# Patient Record
Sex: FEMALE | Race: WHITE | Employment: FULL TIME | URBAN - METROPOLITAN AREA
[De-identification: names, ages, dates, MRNs, and addresses within clinical notes are randomized per-mention and may not be internally consistent; named-entity substitution may affect disease eponyms.]

---

## 2018-06-21 PROBLEM — R79.89 LOW VITAMIN D LEVEL: Status: ACTIVE | Noted: 2018-06-21

## 2020-10-23 PROBLEM — M54.6 PAIN IN THORACIC SPINE: Status: ACTIVE | Noted: 2018-06-07

## 2020-10-23 PROBLEM — R06.02 SHORTNESS OF BREATH: Status: ACTIVE | Noted: 2019-03-03

## 2020-10-23 PROBLEM — F17.210 NICOTINE DEPENDENCE, CIGARETTES, UNCOMPLICATED: Status: ACTIVE | Noted: 2019-03-03

## 2020-10-23 PROBLEM — R07.89 OTHER CHEST PAIN: Status: ACTIVE | Noted: 2019-03-03

## 2020-10-26 DIAGNOSIS — R25.1 TREMOR: ICD-10-CM

## 2020-10-26 LAB
FOLATE: 18.1 NG/ML (ref 7.3–26.1)
T3 TOTAL: 1.19 NG/ML (ref 0.8–2)
T4 TOTAL: 7.1 UG/DL (ref 4.5–11.7)
TSH REFLEX: 1.23 UIU/ML (ref 0.44–3.86)
VITAMIN B-12: 802 PG/ML (ref 232–1245)

## 2021-06-24 PROBLEM — F51.01 PRIMARY INSOMNIA: Status: ACTIVE | Noted: 2021-06-24

## 2022-02-02 PROBLEM — R52 PAIN, UNSPECIFIED: Status: ACTIVE | Noted: 2020-11-20

## 2022-02-02 PROBLEM — M54.9 DORSALGIA, UNSPECIFIED: Status: ACTIVE | Noted: 2019-03-03

## 2022-04-07 PROBLEM — M25.519 SHOULDER PAIN: Status: ACTIVE | Noted: 2022-04-07

## 2022-04-07 PROBLEM — M75.00 ADHESIVE CAPSULITIS OF SHOULDER: Status: ACTIVE | Noted: 2022-04-07

## 2022-11-04 PROBLEM — R52 PAIN, UNSPECIFIED: Status: RESOLVED | Noted: 2020-11-20 | Resolved: 2022-11-04

## 2022-11-04 PROBLEM — M54.9 DORSALGIA, UNSPECIFIED: Status: RESOLVED | Noted: 2019-03-03 | Resolved: 2022-11-04

## 2022-11-04 PROBLEM — M25.519 SHOULDER PAIN: Status: RESOLVED | Noted: 2022-04-07 | Resolved: 2022-11-04

## 2022-11-04 PROBLEM — M54.6 PAIN IN THORACIC SPINE: Status: RESOLVED | Noted: 2018-06-07 | Resolved: 2022-11-04

## 2022-11-04 PROBLEM — R04.0 EPISTAXIS: Status: RESOLVED | Noted: 2020-11-20 | Resolved: 2022-11-04

## 2022-12-13 ENCOUNTER — HOSPITAL ENCOUNTER (OUTPATIENT)
Dept: NEUROLOGY | Age: 52
Discharge: HOME | End: 2022-12-13
Payer: COMMERCIAL

## 2022-12-13 DIAGNOSIS — R20.2 PARESTHESIA OF ARM: ICD-10-CM

## 2022-12-13 DIAGNOSIS — R29.898 HAND WEAKNESS: ICD-10-CM

## 2022-12-13 DIAGNOSIS — M25.531 PAIN IN BOTH WRISTS: ICD-10-CM

## 2022-12-13 DIAGNOSIS — M25.532 PAIN IN BOTH WRISTS: ICD-10-CM

## 2022-12-13 PROCEDURE — 95911 NRV CNDJ TEST 9-10 STUDIES: CPT

## 2022-12-13 PROCEDURE — 95886 MUSC TEST DONE W/N TEST COMP: CPT

## 2022-12-13 NOTE — PROCEDURES
Neelam Reeder [address]                      [facility], [address] [facility]                             ELECTROMYOGRAM REPORT    PATIENT NAME: Mario Alberto MOJICA        :        1970  MED REC NO:   [id number]                            ROOM:  ACCOUNT NO:   [de-identified]                           ADMIT DATE: 2022  PROVIDER:     Fina Gifford MD    DATE OF EM2022    REASON FOR STUDY:  Neurophysiological studies are requested for numbness  in her hands. FINDINGS:  MOTOR NERVE CONDUCTION STUDIES:  Motor nerve conduction study of the  right median nerve shows normal peak latency, amplitudes, and conduction  velocity. Motor nerve conduction study of the left median nerve shows  normal amplitudes, latency, and conduction velocity. Motor nerve  conduction study of the right ulnar nerve shows normal amplitudes,  latency, and conduction velocity. Motor nerve conduction study of the  left ulnar nerve shows normal amplitudes, latency, and conduction  velocity. F-responses are normal.    SENSORY NERVE CONDUCTION STUDIES:  Sensory nerve conduction study of the  right median nerve recorded in digit 2 shows mildly prolonged latencies,  normal amplitudes, and decreased conduction velocity. Further mid palm  stimulation was obtained to confirm findings and shows prolonged  latency, normal amplitudes, and decreased conduction velocity. The left  median digit 2 examination shows borderline peak latency, normal  amplitudes, and borderline conduction velocity. The left median mid  palm stimulation repeated twice shows prolonged latencies, normal  amplitudes, and decreased conduction velocity. The right ulnar mixed  orthodromic study shows normal amplitudes, latency, and conduction  velocity. The left ulnar mixed orthodromic study shows normal  amplitudes, latency, and conduction velocity.   The radial sensory nerve  conduction studies are normal.    Needle electrode examination is essentially normal.  Decreased activated  units are seen in the left abductor pollicis brevis muscle. IMPRESSION:  1. Moderate left median nerve neuropathy at the wrist suggesting carpal  tunnel syndrome. These findings are based on prolonged latencies seen  with palm stimulation only. This was repeated and appeared to show  prolonged latencies. The digit latencies are borderline. Decompressive  surgeries are recommended. 2.  Mild-to-moderate right median nerve neuropathy at the wrist  suggesting carpal tunnel syndrome. Findings again more notable with  palm stimulation than digit 2 examination. No other active or chronic denervation is notable. This test is personally performed and interpreted by me. Multiple sensory nerve conduction studies are evaluated to avoid any  artifact of temperature differences. Thank you Dr. Elisabeth Tobias for asking us to assist in the care of this patient.     With kindest regards,        Ezio Weeks MD    D: 12/13/2022 10:28:27       T: 12/13/2022 10:30:54     [email or web address]  Job#: [id number]     Doc#: [id number]    CC:

## 2022-12-26 DIAGNOSIS — G56.03 BILATERAL CARPAL TUNNEL SYNDROME: Primary | ICD-10-CM

## 2022-12-26 PROBLEM — R20.2 PARESTHESIA OF ARM: Status: ACTIVE | Noted: 2022-12-26

## 2023-02-06 DIAGNOSIS — R06.02 SOB (SHORTNESS OF BREATH): ICD-10-CM

## 2023-02-07 NOTE — TELEPHONE ENCOUNTER
Comments:     Last Office Visit (last PCP visit):   11/4/2022    Next Visit Date:  Future Appointments   Date Time Provider Department Center   4/10/2023  1:00 PM Olvin Julian MD [place] [facility] -       **If hasn't been seen in over a year OR hasn't followed up according to last diabetes/ADHD visit, make appointment for patient before sending refill to provider.    Rx requested:  Requested Prescriptions     Pending Prescriptions Disp Refills    albuterol sulfate HFA (PROVENTIL;VENTOLIN;PROAIR) 108 (90 Base) MCG/ACT inhaler [Pharmacy Med Name: Albuterol Sulfate  (90 Base) MCG/ACT Inhalation Aerosol Solution] 18 g 0     Sig: INHALE 2 PUFFS BY MOUTH EVERY 6 HOURS

## 2023-02-16 RX ORDER — ALBUTEROL SULFATE 90 UG/1
AEROSOL, METERED RESPIRATORY (INHALATION)
Qty: 18 G | Refills: 0 | OUTPATIENT
Start: 2023-02-16

## 2023-04-03 PROBLEM — J43.9 EMPHYSEMA, UNSPECIFIED (HCC): Status: ACTIVE | Noted: 2022-04-21

## 2023-04-03 PROBLEM — M75.102 NONTRAUMATIC TEAR OF LEFT ROTATOR CUFF: Status: ACTIVE | Noted: 2022-05-25

## 2023-04-03 PROBLEM — S43.432A SUPERIOR GLENOID LABRUM LESION OF LEFT SHOULDER: Status: ACTIVE | Noted: 2022-05-25

## 2023-04-03 PROBLEM — M25.612 STIFFNESS OF LEFT SHOULDER, NOT ELSEWHERE CLASSIFIED: Status: ACTIVE | Noted: 2022-04-21

## 2023-04-03 PROBLEM — M25.812 OTHER SPECIFIED JOINT DISORDERS, LEFT SHOULDER: Status: ACTIVE | Noted: 2022-04-21

## 2023-04-03 PROBLEM — K21.9 GASTRO-ESOPHAGEAL REFLUX DISEASE WITHOUT ESOPHAGITIS: Status: ACTIVE | Noted: 2022-04-21

## 2023-04-03 PROBLEM — S43.402A UNSPECIFIED SPRAIN OF LEFT SHOULDER JOINT, INITIAL ENCOUNTER: Status: ACTIVE | Noted: 2022-01-17

## 2023-04-03 PROBLEM — S46.112D STRAIN OF MUSCLE, FASCIA AND TENDON OF LONG HEAD OF BICEPS, LEFT ARM, SUBSEQUENT ENCOUNTER: Status: ACTIVE | Noted: 2022-05-25

## 2023-05-25 ENCOUNTER — TELEPHONE (OUTPATIENT)
Dept: INTERNAL MEDICINE | Age: 53
End: 2023-05-25

## 2023-07-24 ENCOUNTER — HOSPITAL ENCOUNTER (OUTPATIENT)
Dept: LAB | Age: 53
Discharge: HOME | End: 2023-07-24
Payer: COMMERCIAL

## 2023-07-24 LAB
AMPHET UR QL SCN: ABNORMAL
BARBITURATES UR QL SCN: ABNORMAL
BENZODIAZ UR QL SCN: ABNORMAL
CANNABINOIDS UR QL SCN: ABNORMAL
COCAINE UR QL SCN: ABNORMAL
DRUG SCREEN COMMENT UR-IMP: ABNORMAL
FENTANYL SCREEN, URINE: ABNORMAL
METHADONE UR QL SCN: POSITIVE
OPIATES UR QL SCN: ABNORMAL
OXYCODONE UR QL SCN: ABNORMAL
PCP UR QL SCN: ABNORMAL
PROPOXYPH UR QL SCN: ABNORMAL

## 2023-07-24 PROCEDURE — 80307 DRUG TEST PRSMV CHEM ANLYZR: CPT

## 2024-06-17 ENCOUNTER — HOSPITAL ENCOUNTER (OUTPATIENT)
Dept: MRI IMAGING | Age: 54
Discharge: HOME | End: 2024-06-19
Payer: COMMERCIAL

## 2024-06-17 DIAGNOSIS — M51.36 DEGENERATION OF LUMBAR INTERVERTEBRAL DISC: ICD-10-CM

## 2024-06-17 DIAGNOSIS — M51.26 DISPLACEMENT OF LUMBAR INTERVERTEBRAL DISC WITHOUT MYELOPATHY: ICD-10-CM

## 2024-06-17 PROCEDURE — 72146 MRI CHEST SPINE W/O DYE: CPT
